# Patient Record
Sex: FEMALE | Race: WHITE | URBAN - METROPOLITAN AREA
[De-identification: names, ages, dates, MRNs, and addresses within clinical notes are randomized per-mention and may not be internally consistent; named-entity substitution may affect disease eponyms.]

---

## 2017-04-25 ENCOUNTER — TELEPHONE (OUTPATIENT)
Dept: FAMILY MEDICINE | Facility: CLINIC | Age: 67
End: 2017-04-25

## 2017-04-25 ENCOUNTER — OFFICE VISIT (OUTPATIENT)
Dept: FAMILY MEDICINE | Facility: CLINIC | Age: 67
End: 2017-04-25

## 2017-04-25 VITALS
HEART RATE: 71 BPM | SYSTOLIC BLOOD PRESSURE: 148 MMHG | OXYGEN SATURATION: 96 % | BODY MASS INDEX: 31.41 KG/M2 | DIASTOLIC BLOOD PRESSURE: 88 MMHG | TEMPERATURE: 98.2 F | HEIGHT: 64 IN | WEIGHT: 184 LBS

## 2017-04-25 DIAGNOSIS — I51.89 DIASTOLIC DYSFUNCTION: ICD-10-CM

## 2017-04-25 DIAGNOSIS — M25.561 ACUTE PAIN OF RIGHT KNEE: ICD-10-CM

## 2017-04-25 DIAGNOSIS — I10 BENIGN ESSENTIAL HYPERTENSION: ICD-10-CM

## 2017-04-25 DIAGNOSIS — M79.89 RIGHT LEG SWELLING: Primary | ICD-10-CM

## 2017-04-25 PROBLEM — F41.1 GAD (GENERALIZED ANXIETY DISORDER): Status: ACTIVE | Noted: 2017-04-25

## 2017-04-25 PROBLEM — F33.41 RECURRENT MAJOR DEPRESSIVE DISORDER, IN PARTIAL REMISSION (H): Status: ACTIVE | Noted: 2017-04-25

## 2017-04-25 PROBLEM — M35.3 PMR (POLYMYALGIA RHEUMATICA) (H): Status: ACTIVE | Noted: 2017-04-25

## 2017-04-25 PROBLEM — C50.919 MALIGNANT NEOPLASM OF FEMALE BREAST (H): Status: ACTIVE | Noted: 2017-04-25

## 2017-04-25 RX ORDER — LANOLIN ALCOHOL/MO/W.PET/CERES
1000 CREAM (GRAM) TOPICAL DAILY
COMMUNITY

## 2017-04-25 RX ORDER — ERGOCALCIFEROL 1.25 MG/1
5000 CAPSULE, LIQUID FILLED ORAL DAILY
COMMUNITY

## 2017-04-25 RX ORDER — SERTRALINE HYDROCHLORIDE 100 MG/1
TABLET, FILM COATED ORAL
COMMUNITY
Start: 2015-09-29

## 2017-04-25 RX ORDER — VALSARTAN 80 MG/1
TABLET ORAL
COMMUNITY
Start: 2015-09-29

## 2017-04-25 RX ORDER — TRETINOIN 0.5 MG/G
CREAM TOPICAL
COMMUNITY
Start: 2015-09-05

## 2017-04-25 RX ORDER — METOPROLOL TARTRATE 25 MG/1
TABLET, FILM COATED ORAL
COMMUNITY
Start: 2015-09-29

## 2017-04-25 ASSESSMENT — PAIN SCALES - GENERAL: PAINLEVEL: MODERATE PAIN (5)

## 2017-04-25 NOTE — MR AVS SNAPSHOT
After Visit Summary   4/25/2017    Susan Diamond    MRN: [id number]           Patient Information     Date Of Birth          1950        Visit Information        Provider Department      4/25/2017 10:00 AM Christi Malloy PA-C [facility]        Today's Diagnoses     Right leg swelling    -  1    Acute pain of right knee        PMR (polymyalgia rheumatica) (H)        Malignant neoplasm of female breast, unspecified laterality, unspecified site of breast (H)        Recurrent major depressive disorder, in partial remission (H)           Follow-ups after your visit        Your next 10 appointments already scheduled     Apr 25, 2017  2:40 PM CDT   (Arrive by 2:25 PM)   XR KNEE RIGHT 3 VIEWS with UCXR1   [facility] ([facility])    [address] [place] [id number] [phone]           Please bring a list of your current medicines to your exam. (Include vitamins, minerals and over-thecounter medicines.) Leave your valuables at home.  Tell your doctor if there is a chance you may be pregnant.  You do not need to do anything special for this exam.            Apr 25, 2017  3:00 PM CDT   US LOWER EXTREMITY VENOUS DUPLEX RIGHT with UCUS3   [facility] ([facility])    [address] [place] [id number] [phone]           Please bring a list of your medicines (including vitamins, minerals and over-the-counter drugs). Also, tell your doctor about any allergies you may have. Wear comfortable clothes and leave your valuables at home.  You do not need to do anything special to prepare for your exam.  Please call the Imaging Department at your exam site with any questions.              Future tests that were ordered for you today     Open Future Orders        Priority Expected Expires Ordered    X-ray bl Knee 1-2 vw Routine 4/25/2017 4/25/2018 4/25/2017      "Lower Extremity Venous Duplex Right Routine  2018    X-ray rt knee 3 view Routine 2017            Who to contact     Please call your clinic at [phone] to:    Ask questions about your health    Make or cancel appointments    Discuss your medicines    Learn about your test results    Speak to your doctor   If you have compliments or concerns about an experience at your clinic, or if you wish to file a complaint, please contact [facility] Patient Relations at [phone] or email us at [email or web address]         Additional Information About Your Visit        [employer] Information     [employer] is an electronic gateway that provides easy, online access to your medical records. With [employer], you can request a clinic appointment, read your test results, renew a prescription or communicate with your care team.     To sign up for [email or web address] visit the website at [email or web address]   You will be asked to enter the access code listed below, as well as some personal information. Please follow the directions to create your username and password.     Your access code is: [email or web address]  Expires: 2017 10:31 AM     Your access code will  in 90 days. If you need help or a new code, please contact your [facility] or call [phone] for assistance.        Care EveryWhere ID     This is your Care EveryWhere ID. This could be used by other organizations to access your [place] medical records  [id number]        Your Vitals Were     Pulse Temperature Height Pulse Oximetry BMI (Body Mass Index)       71 98.2  F (36.8  C) (Oral) 5' 4\" (162.6 cm) 96% 31.58 kg/m2        Blood Pressure from Last 3 Encounters:   17 148/88    Weight from Last 3 Encounters:   17 184 lb (83.5 kg)               Primary Care Provider Office Phone # Fax #    Traci Robles [phone]       [facility] " [address]  [facility] [address]        Thank you!     Thank you for choosing [facility]  for your care. Our goal is always to provide you with excellent care. Hearing back from our patients is one way we can continue to improve our services. Please take a few minutes to complete the written survey that you may receive in the mail after your visit with us. Thank you!             Your Updated Medication List - Protect others around you: Learn how to safely use, store and throw away your medicines at www.disposemymeds.org.          This list is accurate as of: 4/25/17 12:28 PM.  Always use your most recent med list.                   Brand Name Dispense Instructions for use    cyanocobalamin 1000 MCG tablet    vitamin  B-12     Take 1,000 mcg by mouth daily       metoprolol 25 MG tablet    LOPRESSOR         PREDNISONE PO      Take 6 mg by mouth daily Currently tapering       sertraline 100 MG tablet    ZOLOFT         tretinoin 0.05 % cream    RETIN-A         valsartan 80 MG tablet    DIOVAN         vitamin D 50692 UNIT capsule    ERGOCALCIFEROL     Take 5,000 Units by mouth daily

## 2017-04-25 NOTE — TELEPHONE ENCOUNTER
Called and LM on cell phone that I wanted to speak with patient about the results of the venous ultrasound as well as xrays of her knee.    US shows no sign of blood clot.  Xray consistent with DJD with joint space narrowing.  Suspect meniscal degeneration.  Encouraged patient to call back to discuss.  I will call her at end of day if I do not hear from her.

## 2017-04-25 NOTE — PROGRESS NOTES
SUBJECTIVE:                                                    Susan Diamond is a 66 year old female new patient who presents to clinic today for the following health issues:      Right  Knee pain and swelling for the past week. She complains of pain with walking of the inner upper calf. No history of trauma but had been walking more than normal while she was dog sitting in [facility].  Walking on different terrain that what she normal does and longer distances.    She has been traveling recently with 3.5 hour flights.  She is a nonsmoker not on HRT but she does have a history of breast cancer.   Has been a runner in the past but not recently.  Quite painful when she gets in and out of the care and going from sitting to standing position.  It feels better today. Feels like it may give out.  Feels week     Onset: 7- days ago    Description:   Location: right knee  Character: Cramping, fullness and burning at times, stiff    Intensity: moderate    Progression of Symptoms: intermittent    Accompanying Signs & Symptoms:  Other symptoms: swelling   History:   Previous similar pain: no       Precipitating factors:   Trauma or overuse: no     Alleviating factors:  Improved by: Actvity       Therapies Tried and outcome: Ice and heat with no effect.  Has not used any tylenol or ibuprofen.has taken aspirin.      PCP is at the [facility] but patient [place] looking [place]  [place]    Problem list and histories reviewed & adjusted, as indicated.  Additional history: as documented    Patient Active Problem List    Diagnosis Date Noted     Diastolic dysfunction 04/26/2017     Priority: Medium     Followed by cardiology.  Most recent echocardiogram 2/2017/stable with normal EF. Bi-atrial enlargement.       PMR (polymyalgia rheumatica) (H) 04/25/2017     Priority: Medium     Diagnosed 12/2015 followed by PCP. Had weaned off of the prednisone but went back on a few months ago to try to be more  active. Currently weaning down from 10mg daily currently taking 6 mg daily.       Malignant neoplasm of female breast (H) 04/25/2017     Priority: Medium     Overview:   Presented with left axillary nodes  L mastectomy and axillary node dissection-no primary tumor identified in breast with 5/22 nodes positive with IDC, largest node 5cm  Chemo AC/taxol, herceptin, XRT  Mild-mod lymphedema-Dr. Dinero  Followed by Dr. Fu[facility]. On surveillance only.       Benign essential hypertension 04/25/2017     Priority: Medium     MAYITO (generalized anxiety disorder) 04/25/2017     Priority: Medium     Recurrent major depressive disorder, in partial remission (H) 04/25/2017     Priority: Medium     Doing well on medication         Past Medical History:   Diagnosis Date     Breast cancer (H) 2007     Cervical cancer (H) 1982     Diastolic dysfunction 2006     Diastolic dysfunction     Recent echo cardiogram 2/2017/Care Everywhere     Hypertension      Polymyalgia rheumatica (H) 2015       Past Surgical History:   Procedure Laterality Date     HYSTERECTOMY VAGINAL  1982    Ovaries still attached     MASTECTOMY MODIFIED RADICAL  2007    left side       Family History   Problem Relation Age of Onset     DIABETES Mother      Emphysema Father      DIABETES Maternal Grandmother      Breast Cancer Maternal Grandmother      Prostate Cancer Maternal Grandfather      CEREBROVASCULAR DISEASE Maternal Grandfather      Other Cancer Paternal Grandfather      Emphysema Paternal Grandfather      DIABETES Brother      DIABETES Sister      Hypertension Sister      Alcoholism Sister        Social History   Substance Use Topics     Smoking status: Former Smoker     Types: Cigarettes     Quit date: 1/1/2007     Smokeless tobacco: Not on file     Alcohol use 1.2 oz/week     2 Standard drinks or equivalent per week       Social History     Social History Narrative            Has a good support system.    Feels safe in all environments.     "Wears seatbelt 100% of the time    Wears helmet while biking.    Exercise 3-5 days per week.    Would like to lose weight.    Denies history of abuse, past or present, physical, sexual or emotional.        Diya Malloy PA-C    04/26/17               Current Outpatient Prescriptions   Medication Sig Dispense Refill     metoprolol (LOPRESSOR) 25 MG tablet        sertraline (ZOLOFT) 100 MG tablet        tretinoin (RETIN-A) 0.05 % cream        valsartan (DIOVAN) 80 MG tablet        vitamin D (ERGOCALCIFEROL) 55719 UNIT capsule Take 5,000 Units by mouth daily       cyanocobalamin (VITAMIN  B-12) 1000 MCG tablet Take 1,000 mcg by mouth daily       PREDNISONE PO Take 6 mg by mouth daily Currently tapering           Reviewed and updated as needed this visit by clinical staff  Tobacco  Allergies  Meds  Problems  Med Hx  Surg Hx  Fam Hx  Soc Hx        Reviewed and updated as needed this visit by Provider  Tobacco  Allergies  Meds  Problems  Med Hx  Surg Hx  Fam Hx  Soc Hx          ROS:  Constitutional, HEENT, cardiovascular, pulmonary, gi and gu systems are negative, except as otherwise noted.    OBJECTIVE:                                                    /88 (BP Location: Right arm, Cuff Size: Adult Regular)  Pulse 71  Temp 98.2  F (36.8  C) (Oral)  Ht 5' 4\" (162.6 cm)  Wt 184 lb (83.5 kg)  SpO2 96%  BMI 31.58 kg/m2  Body mass index is 31.58 kg/(m^2).  GENERAL: healthy, alert and no distress  HENT: ear canals and TM's normal, nose and mouth without ulcers or lesions  NECK: no adenopathy, no asymmetry, masses, or scars and thyroid normal to palpation  RESP: lungs clear to auscultation - no rales, rhonchi or wheezes  CV: regular rate and rhythm, normal S1 S2, no S3 or S4, no murmur, click or rub, no peripheral edema and peripheral pulses strong  ABDOMEN: soft, nontender, no hepatosplenomegaly, no masses and bowel sounds normal  MS: no clubbing, edema or cyanosis of extremities. Pulses = and " appropriate bilaterally to DP and PT  Right knee:  Swelling of the right knee noted. Tenderness with palpation medial knee and upper calf. Positive Lachmans with medial pain.  Negative Jose.  Negative drawer. No laxity.  VANNESSA without crepitance.  No palpable posterior calf pain.   SKIN: no suspicious lesions or rashes       ASSESSMENT/PLAN:                                                        ICD-10-CM    1. Right leg swelling M79.89 US Lower Extremity Venous Duplex Right   2. Acute pain of right knee M25.561 X-ray rt knee 3 view     X-ray bl Knee 1-2 vw     CANCELED: X-ray bl Knee 1-2 vw   3. Benign essential hypertension I10    4. Diastolic dysfunction I51.9      Risk factors for DVT.  Rule out with venous US. If negative issue is likely DJD with possible degenerative meniscal issue.    Naprosyn on a regular basis. Limit activity.  ROM exercises.  Follow up in 2 weeks if persistent pain and swelling.  Consider appointment with sport medicine and consider injection. Xrays ordered today.    BP elevated today.  Check at home and make follow up appointment if above 140/90. Patient noted she has not taken her BP meds today yet.    Christi Malloy PA-C  [facility]

## 2017-04-25 NOTE — NURSING NOTE
"Chief Complaint   Patient presents with     Consult     Right Knee issues - patient states that she has been having this issue for about 7-8 days - pain and discomfort - no blood clot per pt - D-Dimer done at [facility]; the more active, the better       Initial /88 (BP Location: Right arm, Cuff Size: Adult Regular)  Pulse 71  Temp 98.2  F (36.8  C) (Oral)  Ht 5' 4\" (162.6 cm)  Wt 184 lb (83.5 kg)  SpO2 96%  BMI 31.58 kg/m2 Estimated body mass index is 31.58 kg/(m^2) as calculated from the following:    Height as of this encounter: 5' 4\" (162.6 cm).    Weight as of this encounter: 184 lb (83.5 kg).  Medication Reconciliation: complete     Mary Odom CMA      "

## 2017-04-25 NOTE — TELEPHONE ENCOUNTER
pateint called back to the clinic and I returned her call and we were able to speak.  Venous US negative for DVT.    Xray consistent with DJD and possibly degenerative meniscal problem.  Since this is a relatively new issue we will treat conservatively with decreased in walking and no running or jumping. Use aleve twice daily with food for the next 5-7 days.  If symptoms persist we can consider physical therapy, and further evaluation and or treatment with steroid injection.  Consider follow up with Dr. Miguel.  Patient understood and was agreeable to plan.  She was appreciative of call and service. She will follow up as needed.  Diya Malloy PA-C

## 2017-04-26 PROBLEM — I51.89 DIASTOLIC DYSFUNCTION: Status: ACTIVE | Noted: 2017-04-26
